# Patient Record
Sex: MALE | Race: WHITE
[De-identification: names, ages, dates, MRNs, and addresses within clinical notes are randomized per-mention and may not be internally consistent; named-entity substitution may affect disease eponyms.]

---

## 2019-06-20 ENCOUNTER — HOSPITAL ENCOUNTER (OUTPATIENT)
Dept: HOSPITAL 89 - RAD | Age: 54
End: 2019-06-20
Attending: INTERNAL MEDICINE
Payer: SELF-PAY

## 2019-06-20 DIAGNOSIS — M54.5: Primary | ICD-10-CM

## 2019-06-20 PROCEDURE — 72100 X-RAY EXAM L-S SPINE 2/3 VWS: CPT

## 2019-06-20 NOTE — RADIOLOGY IMAGING REPORT
FACILITY: [facility] - [place] 

 

PATIENT NAME: Raoul Perez

: 1965

MR: [id number]

V: [id number]

EXAM DATE: 

ORDERING PHYSICIAN: NICK VILLALOBOS

TECHNOLOGIST: 

 

Location: [facility] - [place]

Patient: Raoul Perez

: 1965

MRN: [id number]

Visit/Account:[id number]

Date of Sevice:  2019

 

ACCESSION #: [id number]

 

Lumbar spine 3 views:

 

HISTORY: Low back pain

 

COMPARISON: 2017

 

FINDINGS: 3 views were obtained of the lumbar spine.  There are 5 nonrib-bearing lumbar-type vertebra
l bodies.  Alignment is anatomic.  There is no spondylolisthesis or definite evidence spondylolysis. 
 There is again noted to be mild disc space narrowing at L1-3 with mild endplate sclerosis and osteop
hyte formation.  Osteophytes project off the endplates at the L3-4 level.  Mild facet arthropathy pre
sent at L4-5.

 

Again noted is partial sacralization of L5 versus diminutive disc space.  Vertebral body height is pr
eserved.  There is no focal compression deformity.

 

IMPRESSION:

 

1.  Mild spondylitic changes most significant at L2-3, these changes have progressed slightly compare
d to previous.

 

2.  No focal compression deformity.

 

Report Dictated By: Ceci Jones MD at 2019 3:59 PM

 

Report E-Signed By: Ceci Jones MD  at 2019 4:04 PM

 

WSN:[id number]

## 2019-08-12 ENCOUNTER — HOSPITAL ENCOUNTER (EMERGENCY)
Dept: HOSPITAL 89 - ER | Age: 54
Discharge: HOME | End: 2019-08-12
Payer: MEDICAID

## 2019-08-12 VITALS — SYSTOLIC BLOOD PRESSURE: 129 MMHG | DIASTOLIC BLOOD PRESSURE: 78 MMHG

## 2019-08-12 DIAGNOSIS — J40: Primary | ICD-10-CM

## 2019-08-12 DIAGNOSIS — F17.210: ICD-10-CM

## 2019-08-12 PROCEDURE — 99283 EMERGENCY DEPT VISIT LOW MDM: CPT

## 2019-08-12 PROCEDURE — 71046 X-RAY EXAM CHEST 2 VIEWS: CPT

## 2019-08-12 PROCEDURE — 94640 AIRWAY INHALATION TREATMENT: CPT

## 2019-08-12 NOTE — ER REPORT
History and Physical


Time Seen By MD:  13:27


HPI/ROS


CHIEF COMPLAINT: Cough





HISTORY OF PRESENT ILLNESS: This is a 54-year-old male who presents to the 


emergency department for cough. Patient states that he's had a semi-productive 


cough that part of one week. He states that he was up in [place] and also works 


with [employer], he states that over the last several days the coughing 


has increased in intensity, no fevers at home, no nausea or vomiting. No rashes.


Denies chest pain. He does smoke cigarettes, no history of asthma.





REVIEW OF SYSTEMS:


Respiratory: As above


Cardiovascular: No chest pain, no palpitations.


Gastrointestinal: No vomiting, no abdominal pain.


Musculoskeletal: No back pain.


Allergies:  


Coded Allergies:  


     No Known Drug Allergies (Unverified , 19)


Home Meds


Active Scripts


Prednisone (PREDNISONE) 20 Mg Tablet, 20 MG PO BID, #10 TAB


   Prov:HEATHER PHELPS [facility]         19


Benzonatate (BENZONATATE) 200 Mg Capsule, 200 MG PO TID PRN for COUGH, #15 CAP


   Prov:HEATHER PHELPS [facility]         19


Azithromycin 250 Mg Tab (AZITHROMYCIN 250 MG TAB) 250 Mg Tablet, 1 TAB PO QDAY, 


#6 TAB


   Take 2 tabs today and then 1 tab a day until gone.


   Prov:HEATHER PHELPS [facility]         19


Sulfamethoxazole/Trimet 800-160 Mg Tab (BACTRIM DS TABLET) 1 Each Tablet, 1 TAB 


PO Q12H, #20 TAB 0 Refills


   Prov:FLEX FUENTES MD         17


Amoxicillin/Pot Clav 875-125 Mg Tab (AUGMENTIN 875-125 TABLET) 1 Each Tablet, 1 


TAB PO BID, #14 TAB 0 Refills


   Prov:ULLRICH,JOHN A MD         17


Oxycodone Hcl/Acet 5/325 Mg (ENDOCET 5-325 TABLET) 1 Each Tablet, 1 TAB PO Q4H 


PRN for PAIN, #14 TAB 0 Refills


   Prov:ULLRICH,JOHN A MD         17


Reported Medications


Doxepin Hcl (DOXEPIN HCL) 10 Mg Capsule, 10 MG PO, CAPSULE


   16


Diazepam (VALIUM) 5 Mg Tablet, 5 MG PO PRN, #5 TAB


   16


Terazosin Hcl (TERAZOSIN HCL) 5 Mg Capsule, 5 MG PO QHS, CAPSULE


   16


Amlodipine Besylate (AMLODIPINE BESYLATE) 5 Mg Tablet, 1 TAB PO QDAY, TAB


   16


Past Medical/Surgical History


The patient has a past medical and surgical history of hypothyroidism, angina, 


hypertension, COPD, enlarged prostate, depression, alcohol use occasional, 


smokes cigarettes.


Reviewed Nurses Notes:  Yes


Hx Smoking:  Yes (1PPD DAY X 38 YRS)


Smoking Status:  Current: Every Day Smoker


Hx Substance Use Disorder:  No


Hx Alcohol Use:  Yes (occ)


Constitutional





Vital Sign - Last 24 Hours








 19





 13:32 14:07 14:07 14:49


 


Temp 98.5   


 


Pulse 74  59 68


 


Resp 16  18 18


 


B/P (MAP) 144/90   129/78 (95)


 


Pulse Ox 95 91  99


 


O2 Delivery Room Air Room Air  








Physical Exam


   General Appearance: The patient is alert, has no immediate need for airway 


protection and no signs of toxicity. 


Eyes: Pupils equal and round no pallor or injection.


ENT, Mouth: Mucous membranes are moist.


Respiratory: Left lower lobe coarse on auscultation otherwise clear throughout. 


Dullness to the left lower lobe with percussion.


Cardiovascular: Regular rate and rhythm. No murmurs, clicks or rubs.


Gastrointestinal:  Abdomen is soft and non tender, no masses, bowel sounds 


normal.


Neurological: Alert and oriented 4. Moving all extremities. Following all 


commands. No focal neurodeficits.


Skin: Warm and dry, no rashes.


Musculoskeletal:  Neck is supple non tender.


      Extremities are nontender, nonswollen and have full range of motion.





DIFFERENTIAL DIAGNOSIS: After history and physical exam differential diagnosis 


was considered for coughing including but not limited to pulmonary infectious 


process, COPD, asthma, pulmonary embolus and congestive heart failure.





Medical Decision Making


EKG/Imaging


Imaging





PATIENT NAME: Raoul Perez


: 1965


MR: [id number]


V: [id number]


EXAM DATE: 


ORDERING PHYSICIAN: HEATHER PHELPS


TECHNOLOGIST: 


 


Location: [facility]


Patient: Raoul Perez


: 1965


MRN: [id number]


Visit/Account:[id number]


Date of Sevalireza:  2019


 


ACCESSION #: [id number]


 


Exam type: CHEST PA LAT


 


History: Smoker, one pack per day for 38 years


 


Comparison: 2016.


 


Findings:


 


The lungs are free of acute effusions, infiltrates or edema.  There is no 


evidence of a pneumothorax or pneumomediastinum.  The cardiac silhouette is 


normal in size.  Trachea is in midline.


 


IMPRESSION:


 


1.  No acute cardiopulmonary process is seen


 


Report Dictated By: Lili Woods MD at 2019 2:04 PM


 


Report E-Signed By: Lili Woods MD  at 2019 2:07 PM


 


WSN:[email or web address]





ED Course/Re-evaluation


ED Course


The patient was admitted to room. A history of square obtained. Differential 


diagnoses were considered. Patient was given a DuoNeb. Patient's states feeling 


better after the DuoNeb, coughing has improved. Two-view chest x-ray negative 


for any acute pulmonary process. I did review the results with the patient, I 


did tell him this is likely a viral illness but did recommend albuterol MDI, 


prednisone and benzonatate. Patient was agreeable, I also said I would send a 


prescription to the pharmacy for antibiotics, not to be filled for at least 24 


hours after trialing the other medications 1st, patient was agreeable with this,


also follow-up with his primary care provider for any other concerns is 


agreeable with this plan of care and discharged home.


Decision to Disposition Date:  Aug 12, 2019


Decision to Disposition Time:  14:24





Depart


Departure


Latest Vital Signs





Vital Signs








  Date Time  Temp Pulse Resp B/P (MAP) Pulse Ox O2 Delivery O2 Flow Rate FiO2


 


19 14:49  68 18 129/78 (95) 99   


 


19 14:07      Room Air  


 


19 13:32 98.5       








Impression:  


   Primary Impression:  


   Bronchitis


Condition:  Improved


Disposition:  HOME OR SELF-CARE


Referrals:  


ARLIN MANCILLA DO (PCP)


New Scripts


Prednisone (PREDNISONE) 20 Mg Tablet


20 MG PO BID, #10 TAB


   Prov: HEATHER PHELPS [facility]         19 


Benzonatate (BENZONATATE) 200 Mg Capsule


200 MG PO TID PRN for COUGH, #15 CAP


   Prov: HEATHER PHELPS [facility]         19 


Azithromycin 250 Mg Tab (AZITHROMYCIN 250 MG TAB) 250 Mg Tablet


1 TAB PO QDAY, #6 TAB


   Take 2 tabs today and then 1 tab a day until gone.


   Prov: HEATHER PHELPS         19


Patient Instructions:  Acute Bronchitis (ED)





Additional Instructions:  


No indication of pneumonia, this is likely a viral illness.


Be sure to drink plenty of water.


Get plenty of rest.


Use the inhaler for coughing episodes.


Take the prednisone as prescribed for the next 5 days.


Try the benzonatate to see if this will work for your coughing episodes.


You can also try one spoonful of honey every 3-4 hours while awake, this may 


help with your coughing episodes as well.


If no improvement in the next 24 hours, then you can get the antibiotics filled.


Return to the ER for any other concerns or worsening symptoms.


Follow-up with Dr. Mancilla or any other concerns.











HEATHER PHELPS      Aug 12, 2019 13:35

## 2019-08-12 NOTE — RADIOLOGY IMAGING REPORT
FACILITY: [facility] - [place] 

 

PATIENT NAME: Raoul Perez

: 1965

MR: [id number]

V: [id number]

EXAM DATE: 

ORDERING PHYSICIAN: HEATHER PHELPS

TECHNOLOGIST: 

 

[place] [facility] [place]: Raoul Perez

: 1965

MRN: [id number]

Visit/Account:[id number]

Date of Sevice:  2019

 

ACCESSION #: [id number]

 

Exam type: CHEST PA LAT

 

History: Smoker, one pack per day for 38 years

 

Comparison: 2016.

 

Findings:

 

The lungs are free of acute effusions, infiltrates or edema.  There is no evidence of a pneumothorax 
or pneumomediastinum.  The cardiac silhouette is normal in size.  Trachea is in midline.

 

IMPRESSION:

 

1.  No acute cardiopulmonary process is seen

 

Report Dictated By: Lili Woods MD at 2019 2:04 PM

 

Report E-Signed By: Lili Woods MD  at 2019 2:07 PM

 

WSN:[email or web address]

## 2019-08-14 ENCOUNTER — HOSPITAL ENCOUNTER (EMERGENCY)
Dept: HOSPITAL 89 - ER | Age: 54
Discharge: HOME | End: 2019-08-14
Payer: MEDICAID

## 2019-08-14 VITALS — DIASTOLIC BLOOD PRESSURE: 97 MMHG | SYSTOLIC BLOOD PRESSURE: 162 MMHG

## 2019-08-14 DIAGNOSIS — J40: Primary | ICD-10-CM

## 2019-08-14 LAB — PLATELET COUNT, AUTOMATED: 258 K/UL (ref 150–450)

## 2019-08-14 PROCEDURE — 84075 ASSAY ALKALINE PHOSPHATASE: CPT

## 2019-08-14 PROCEDURE — 99284 EMERGENCY DEPT VISIT MOD MDM: CPT

## 2019-08-14 PROCEDURE — 85025 COMPLETE CBC W/AUTO DIFF WBC: CPT

## 2019-08-14 PROCEDURE — 96360 HYDRATION IV INFUSION INIT: CPT

## 2019-08-14 PROCEDURE — 71275 CT ANGIOGRAPHY CHEST: CPT

## 2019-08-14 PROCEDURE — 82040 ASSAY OF SERUM ALBUMIN: CPT

## 2019-08-14 PROCEDURE — 82247 BILIRUBIN TOTAL: CPT

## 2019-08-14 PROCEDURE — 84450 TRANSFERASE (AST) (SGOT): CPT

## 2019-08-14 PROCEDURE — 84155 ASSAY OF PROTEIN SERUM: CPT

## 2019-08-14 PROCEDURE — 82374 ASSAY BLOOD CARBON DIOXIDE: CPT

## 2019-08-14 PROCEDURE — 94640 AIRWAY INHALATION TREATMENT: CPT

## 2019-08-14 PROCEDURE — 82565 ASSAY OF CREATININE: CPT

## 2019-08-14 PROCEDURE — 84484 ASSAY OF TROPONIN QUANT: CPT

## 2019-08-14 PROCEDURE — 93005 ELECTROCARDIOGRAM TRACING: CPT

## 2019-08-14 PROCEDURE — 84295 ASSAY OF SERUM SODIUM: CPT

## 2019-08-14 PROCEDURE — 82310 ASSAY OF CALCIUM: CPT

## 2019-08-14 PROCEDURE — 82947 ASSAY GLUCOSE BLOOD QUANT: CPT

## 2019-08-14 PROCEDURE — 84520 ASSAY OF UREA NITROGEN: CPT

## 2019-08-14 PROCEDURE — 84132 ASSAY OF SERUM POTASSIUM: CPT

## 2019-08-14 PROCEDURE — 82435 ASSAY OF BLOOD CHLORIDE: CPT

## 2019-08-14 PROCEDURE — 84460 ALANINE AMINO (ALT) (SGPT): CPT

## 2019-08-14 NOTE — RADIOLOGY IMAGING REPORT
FACILITY: [facility] 

 

PATIENT NAME: Raoul Perez

: 1965

MR: [id number]

V: [id number]

EXAM DATE: 

ORDERING PHYSICIAN: HENRY PHILLIPS

TECHNOLOGIST: 

 

Location: [facility] - [place]

Patient: Raoul Perez

: 1965

MRN: [id number]

Visit/Account:[id number]

Date of Sevice:  2019

 

ACCESSION #: [id number]

 

CT angiogram chest with contrast

 

Indication: Cough and shortness of breath for one week.

 

Comparison: None available.

 

Technique: Axial CT images are obtained through the chest after administration of 75 mL Isovue 370 IV
 contrast. Reformatted coronal and sagittal images were reviewed as well as coronal MIP images.

 One of the following dose optimization techniques was utilized in the performance of this exam: auto
mated exposure control; adjustment of the mA and/or kV according to the patient's size; or use of an 
iterative reconstruction technique.  Specific details can be referenced in the facility's radiology C
T exam operational policy.

 

 

FINDINGS:

No evidence of filling defect within the pulmonary vasculature to suggest pulmonary embolus.

 

Heart is normal size without pericardial effusion. Aorta shows no aneurysm or dissection. The mediast
inum and hilar regions show no enlarged lymph nodes or abnormal density.

 

Lungs show mild dependent atelectasis. No consolidations, pleural effusion or pneumothorax. No discre
te nodule. No focal interstitial opacities. Airways are clear.

 

Bony structures show no acute fractures or aggressive bony lesions. Chest wall shows no enlarged axil
farhad lymph nodes or masses.

 

Tiny gallstone without other gallbladder abnormality. The biliary system is unremarkable. The upper a
bdomen is otherwise unremarkable.

 

IMPRESSION:

1. No evidence of pulmonary embolus.

2. No acute cardiothoracic abnormality

3. Cholelithiasis with a tiny gallstone. No indication of cholecystitis.

 

Report Dictated By: Raoul Blanco at 2019 6:48 PM

 

Report E-Signed By: Raoul Blanco  at 2019 6:56 PM

 

WSN:[id number]

## 2019-08-14 NOTE — ER REPORT
History and Physical


Time Seen By MD:  17:11


Hx. of Stated Complaint:  


PATIENT WAS SEEN 2 DAYS AGO AND DIAGNOSED WITH BRONCHITIS. HE IS NOT FEELING 


BETTER DESPITE TAKING ANTIBIOTICS AND STEROIDS


HPI/ROS


CHIEF COMPLAINT: Cough, shortness of breath





HISTORY OF PRESENT ILLNESS: 54-year-old male patient presents to emergency room 


with complaint of cough and shortness of breath. Patient states he was seen on 


Monday for the same problem. He states that he has been working with asbestos, 


during removal, and then traveled up to [place]. On his way home he developed 


significant cough and shortness of breath. He denies any fevers or chills. He 


states that he does have some discomfort especially in the throat. He states 


that he has not had any nausea, vomiting or diarrhea. Patient states he's been 


taking antibiotics and steroids afraid bronchitis which was diagnosed 2 days 


ago.





REVIEW OF SYSTEMS:


Respiratory: Noted above


Cardiovascular: No chest pain, no palpitations.


Gastrointestinal: No vomiting, no abdominal pain.


Musculoskeletal: No back pain.


Allergies:  


Coded Allergies:  


     No Known Drug Allergies (Unverified , 8/12/19)


Home Meds


Active Scripts


Prednisone (PREDNISONE) 20 Mg Tablet, 20 MG PO BID, #10 TAB


   Prov:HEATHER PHELPS [facility]         8/12/19


Benzonatate (BENZONATATE) 200 Mg Capsule, 200 MG PO TID PRN for COUGH, #15 CAP


   Prov:HEATHER PHELPS [facility]         8/12/19


Azithromycin 250 Mg Tab (AZITHROMYCIN 250 MG TAB) 250 Mg Tablet, 1 TAB PO QDAY, 


#6 TAB


   Take 2 tabs today and then 1 tab a day until gone.


   Prov:HEATHER PHELPS [facility]         8/12/19


Sulfamethoxazole/Trimet 800-160 Mg Tab (BACTRIM DS TABLET) 1 Each Tablet, 1 TAB 


PO Q12H, #20 TAB 0 Refills


   Prov:FLEX FUENTES MD         5/4/17


Amoxicillin/Pot Clav 875-125 Mg Tab (AUGMENTIN 875-125 TABLET) 1 Each Tablet, 1 


TAB PO BID, #14 TAB 0 Refills


   Prov:ULLRICH,JOHN A MD         1/11/17


Oxycodone Hcl/Acet 5/325 Mg (ENDOCET 5-325 TABLET) 1 Each Tablet, 1 TAB PO Q4H 


PRN for PAIN, #14 TAB 0 Refills


   Prov:ULLRICH,JOHN A MD         1/11/17


Reported Medications


Doxepin Hcl (DOXEPIN HCL) 10 Mg Capsule, 10 MG PO, CAPSULE


   12/12/16


Diazepam (VALIUM) 5 Mg Tablet, 5 MG PO PRN, #5 TAB


   12/12/16


Terazosin Hcl (TERAZOSIN HCL) 5 Mg Capsule, 5 MG PO QHS, CAPSULE


   12/12/16


Amlodipine Besylate (AMLODIPINE BESYLATE) 5 Mg Tablet, 1 TAB PO QDAY, TAB


   12/12/16


Past Medical/Surgical History


Patient has a past medical history of angina, hypertension, COPD, urinary 


urgency, frequency, hypothyroidism, alcohol abuse, anxiety.


Patient has no pertinent surgical history.


Reviewed Nurses Notes:  Yes


Hx Smoking:  Yes (1PPD DAY X 38 YRS)


Smoking Status:  Current: Every Day Smoker


Hx Substance Use Disorder:  No


Hx Alcohol Use:  Yes (occ)


Constitutional





Vital Sign - Last 24 Hours








 8/14/19 8/14/19 8/14/19 8/14/19





 17:05 17:09 17:30 17:34


 


Temp  98.0  


 


Pulse  65  80


 


Resp  20  


 


B/P (MAP) 146/91 (109) 146/91 136/85 (102) 


 


Pulse Ox  92  94


 


O2 Delivery  Room Air  


 


    





 8/14/19 8/14/19 8/14/19 8/14/19





 17:55 17:55 18:30 18:34


 


Pulse  79  77


 


Resp  18  


 


B/P (MAP)   148/97 (114) 


 


Pulse Ox 92   97


 


O2 Delivery Room Air   





 8/14/19 8/14/19 8/14/19 8/14/19





 18:39 18:54 19:00 19:09


 


Pulse 73 74  70


 


B/P (MAP)   162/97 (118) 


 


Pulse Ox 95 97  96








Physical Exam


  General Appearance: The patient is alert, has no immediate need for airway 


protection and no current signs of toxicity.


Respiratory: Chest is non tender, lungs are clear to auscultation.


Cardiac: regular rate and rhythm


Gastrointestinal: Abdomen is soft and non tender, no masses, bowel sounds 


normal.


Musculoskeletal:  Neck: Neck is supple and non tender.


   Extremities have full range of motion and are non tender.


Skin: No rashes or lesions.





DIFFERENTIAL DIAGNOSIS: After history and physical exam differential diagnosis 


was considered for shortness of breath including but not limited to pulmonary 


infectious process, COPD, asthma, pulmonary embolus and congestive heart 


failure.





Medical Decision Making


Data Points


Result Diagram:  


8/14/19 1721                                                                    


           8/14/19 1721





Laboratory





Hematology








Test


 8/14/19


17:21


 


White Blood Count


 10.5 k/uL


(4.5-11.0)


 


Red Blood Count


 4.83 M/uL


(4.00-5.60)


 


Hemoglobin


 14.6 g/dL


(14.0-18.0)


 


Hematocrit


 41.8 %


(42.0-52.0)  L


 


Mean Corpuscular Volume


 86.5 fL


(80.0-96.0)


 


Mean Corpuscular Hemoglobin


 30.2 pg


(26.0-33.0)


 


Mean Corpuscular Hemoglobin


Concent 35.0 g/dL


(32.0-36.0)


 


Red Cell Distribution Width


 13.0 %


(11.5-14.5)


 


Platelet Count


 258 K/uL


(150-450)


 


Mean Platelet Volume


 8.5 fL


(7.2-11.1)


 


Neutrophils (%) (Auto)


 79.6 %


(39.4-72.5)  H


 


Lymphocytes (%) (Auto)


 13.2 %


(17.6-49.6)  L


 


Monocytes (%) (Auto)


 6.8 %


(4.1-12.4)


 


Eosinophils (%) (Auto)


 0.1 %


(0.4-6.7)  L


 


Basophils (%) (Auto)


 0.3 %


(0.3-1.4)


 


Nucleated RBC Relative Count


(auto) 0.3 /100WBC  





 


Neutrophils # (Auto)


 8.4 K/uL


(2.0-7.4)  H


 


Lymphocytes # (Auto)


 1.4 K/uL


(1.3-3.6)


 


Monocytes # (Auto)


 0.7 K/uL


(0.3-1.0)


 


Eosinophils # (Auto)


 0.0 K/uL


(0.0-0.5)


 


Basophils # (Auto)


 0.0 K/uL


(0.0-0.1)


 


Nucleated RBC Absolute Count


(auto) 0.04 K/uL  











Chemistry








Test


 8/14/19


17:21


 


Sodium Level


 142 mmol/L


(137-145)


 


Potassium Level


 3.5 mmol/L


(3.5-5.0)


 


Chloride Level


 109 mmol/L


()


 


Carbon Dioxide Level


 21 mmol/L


(22-30)


 


Blood Urea Nitrogen


 11 mg/dl


(9-21)


 


Creatinine


 0.90 mg/dl


(0.66-1.25)


 


Glomerular Filtration Rate


Calc > 60.0 





 


Random Glucose


 108 mg/dl


()


 


Calcium Level


 8.7 mg/dl


(8.4-10.2)


 


Total Bilirubin


 0.2 mg/dl


(0.2-1.3)


 


Aspartate Amino Transf


(AST/SGOT) 27 U/L (0-35) 





 


Alanine Aminotransferase


(ALT/SGPT) 40 U/L (0-56) 





 


Alkaline Phosphatase 59 U/L (0-126) 


 


Troponin I < 0.012 ng/ml 


 


Total Protein


 6.7 g/dl


(6.3-8.2)


 


Albumin


 3.9 g/dl


(3.5-5.0)











EKG/Imaging


EKG Interpretation


12 lead EKG:


      Rhythm: normal sinus rhythm with a ventricular rate of 71 bpm


      Axis: normal 


      QRS: normal


      ST segments: normal


Imaging


ACCESSION #: [id number]


 


CT angiogram chest with contrast


 


Indication: Cough and shortness of breath for one week.


 


Comparison: None available.


 


Technique: Axial CT images are obtained through the chest after administration 


of 75 mL Isovue 370 IV contrast. Reformatted coronal and sagittal images were 


reviewed as well as coronal MIP images.


 One of the following dose optimization techniques was utilized in the 


performance of this exam: automated exposure control; adjustment of the mA 


and/or kV according to the patient's size; or use of an iterative reconstruction


technique.  Specific details can be referenced in the facility's radiology CT 


exam operational policy.


 


 


FINDINGS:


No evidence of filling defect within the pulmonary vasculature to suggest 


pulmonary embolus.


 


Heart is normal size without pericardial effusion. Aorta shows no aneurysm or 


dissection. The mediastinum and hilar regions show no enlarged lymph nodes or 


abnormal density.


 


Lungs show mild dependent atelectasis. No consolidations, pleural effusion or 


pneumothorax. No discrete nodule. No focal interstitial opacities. Airways are 


clear.


 


Bony structures show no acute fractures or aggressive bony lesions. Chest wall 


shows no enlarged axillary lymph nodes or masses.


 


Tiny gallstone without other gallbladder abnormality. The biliary system is 


unremarkable. The upper abdomen is otherwise unremarkable.


 


IMPRESSION:


1. No evidence of pulmonary embolus.


2. No acute cardiothoracic abnormality


3. Cholelithiasis with a tiny gallstone. No indication of cholecystitis.


 


Report Dictated By: Raoul Blanco at 8/14/2019 6:48 PM


 


Report E-Signed By: Raoul Blanco  at 8/14/2019 6:56 PM


 


WSN:[id number]





ED Course/Re-evaluation


ED Course


Patient presented to the ED with a cough for the last week that has been 


worsening. 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 Was seen in ED 2 


days ago and given a Z-pack, Benzonatate and albuterol inhaler. Has been using 


them all but cough continues to worsen. Physical exam reveals a well appearing 


male with cough. Lungs were clear to auscultation with no suspicion of 


consolidation. CTA, CBC, CMP, UA and EKG were orderd which showed no acute 


abnormalities other than a gall bladder stone. Patient was given an breathing 


treatment with albuterol and ipatropium which improved his cough. Patient 


understood his diagnosis of bronchitis and that his cough can last up to 6 


weeks. Patient discharged and instructed to follow up with PCP.


Decision to Disposition Date:  Aug 14, 2019


Decision to Disposition Time:  19:15





Depart


Departure


Latest Vital Signs





Vital Signs








  Date Time  Temp Pulse Resp B/P (MAP) Pulse Ox O2 Delivery O2 Flow Rate FiO2


 


8/14/19 19:09  70   96   


 


8/14/19 19:00    162/97 (118)    


 


8/14/19 17:55   18     


 


8/14/19 17:55      Room Air  


 


8/14/19 17:09 98.0       








Impression:  


   Primary Impression:  


   Bronchitis


Condition:  Improved


Disposition:  HOME OR SELF-CARE


Referrals:  


ARLIN COFFMAN DO (PCP)


Patient Instructions:  Acute Bronchitis (ED)





Additional Instructions:  


Continue with antibiotics until you finish them all. Continue other medications 


given including the inhaler until symptoms resolve. May use inhaler every 4-6 


hours. Rest, fluids and a humidifier will help with the cough. Follow up with 


primary care provider. Return to ED if symptoms suddenly worsen.











HENRY PHILLIPS                Aug 14, 2019 17:15
